# Patient Record
Sex: MALE | Race: WHITE | NOT HISPANIC OR LATINO | Employment: OTHER | URBAN - METROPOLITAN AREA
[De-identification: names, ages, dates, MRNs, and addresses within clinical notes are randomized per-mention and may not be internally consistent; named-entity substitution may affect disease eponyms.]

---

## 2019-11-18 PROBLEM — M54.12 RADICULOPATHY, CERVICAL REGION: Status: ACTIVE | Noted: 2019-11-18

## 2019-11-18 PROBLEM — M99.51 INTERVERTEBRAL DISC STENOSIS OF NEURAL CANAL OF CERVICAL REGION: Status: ACTIVE | Noted: 2019-11-18

## 2022-09-16 ENCOUNTER — NON-PROVIDER VISIT (OUTPATIENT)
Dept: NEUROLOGY | Facility: MEDICAL CENTER | Age: 77
End: 2022-09-16
Attending: SPECIALIST
Payer: MEDICARE

## 2022-09-16 DIAGNOSIS — G70.00 MYASTHENIA (HCC): ICD-10-CM

## 2022-09-16 DIAGNOSIS — R26.9 ABNORMAL GAIT: ICD-10-CM

## 2022-09-16 DIAGNOSIS — R29.898 BILATERAL LEG WEAKNESS: ICD-10-CM

## 2022-09-16 PROCEDURE — 95886 MUSC TEST DONE W/N TEST COMP: CPT | Mod: 26 | Performed by: SPECIALIST

## 2022-09-16 PROCEDURE — 95912 NRV CNDJ TEST 11-12 STUDIES: CPT | Performed by: SPECIALIST

## 2022-09-16 PROCEDURE — 95912 NRV CNDJ TEST 11-12 STUDIES: CPT | Mod: 26 | Performed by: SPECIALIST

## 2022-09-16 PROCEDURE — 95937 NEUROMUSCULAR JUNCTION TEST: CPT | Performed by: SPECIALIST

## 2022-09-16 PROCEDURE — 95937 NEUROMUSCULAR JUNCTION TEST: CPT | Mod: 26 | Performed by: SPECIALIST

## 2022-09-16 PROCEDURE — 95886 MUSC TEST DONE W/N TEST COMP: CPT | Performed by: SPECIALIST

## 2022-09-16 NOTE — PROCEDURES
NERVE CONDUCTION STUDIES AND ELECTROMYOGRAPHY REPORT        09/16/22      Referring provider: Greg Doherty M.D.      SUMMARY OF PATIENT'S CLINICAL HISTORY,PHYSICAL EXAM, AND RATIONALE FOR TESTING:    Mr. Preet Cardenas 77 y.o. presenting with difficulty with ambulation and fluctuating weakness and fatigue in a 77-year-old patient with type 2 diabetes.    Past Medical History is significant for : No past medical history on file.    The electrodiagnostic studies were performed to evaluate for possible peripheral neuropathy, myopathy, possible neuromuscular disease.      ELECTRODIAGNOSTIC EXAMINATION:  Nerve conduction studies (NCS) and electromyography (EMG) are utilized to evaluate direct or indirect damage to the peripheral nervous system. NCS are performed to measure the nerve(s) response(s) to electrostimulation across a given nerve segment. EMG evaluates the passive and active electrical activity of the muscle(s) in question.  Muscles are innervated by specific peripheral nerves and roots. Often times, several nerves the muscle to be examined in order to determine the presence or absence of the disease process. Furthermore, nerves and muscles may need to be tested in a [id number] comparison, as well as in additional extremities, as this may be crucial in characterizing the extent of the disease process, which may be diffuse or isolated and of varying degree of severity. The extent of the neurodiagnostic exam is justified as it may help arrive to a proper diagnosis, which ultimately may contribute to better management of the patient. Therefore, the nerves to muscles examined during the study were medically necessary.    Unless otherwise noted, temperature of the extremity(s) study was monitored before and during the examination and remained between 32 and 36 degrees C for the upper extremities, and between 30 and 36 degrees C for the lower extremities.      NERVE CONDUCTION STUDIES:  Sensory nerves:   -Right  median sensory nerve was examined.The response was abnormal.  Onset latency was within normal limits, amplitude was decreased to 3.4 µV.  -Right ulnar sensory nerve was examined. The response was within normal limits.  - Bilateral Sural nerves were tested. The responses were not elicitable with antidromic stimuli bilaterally.    Motor nerves:   -Right median motor nerve was examined. Recording electrodes placed at the Abductor Pollicis Brevis muscles. The response was within normal limits.  -Right ulnar motor nerve was examined. Recording electrodes placed at the Abductor Digiti Minimi muscles. The response was abnormal.  Onset latency was within normal limits, amplitude was decreased to 5.7 mV and conduction velocity was within normal limits.  - Bilateral Tibial nerves were examined. Recording electrodes placed at the Abductor Hallucis muscles. The responses were abnormal bilaterally, onset latency was within normal limits bilaterally, amplitude was decreased to 1.0 mV bilaterally and conduction velocity was normal bilaterally.  - Bilateral Deep Peroneal motor nerves were examined. Recording electrodes were placed at the Extensor Digitorum Brevis muscles.The responses were abnormal bilaterally.  The response on the left could not be elicited with either proximal or distal stimuli.  The response on the right exhibited normal onset latency and decreased amplitude of 2.0 mV.  -Left common peroneal motor nerve was examined.  Recording electrodes were placed at the tibialis anterior muscle.  The response was within normal limits.    Repetitive stimulation:  Repetitive stimulation was performed on the right median nerve with recording over the abductor pollicis brevis muscle.  The response was within normal limits without incremental or decremental response.      ELECTROMYOGRAPHY:  The study was performed the concentric needle electrode. Fibrillation and fasciculation activity is graded by convention from none (0) to  continuous (4+).  Needle electrode examination was performed in the following muscles: Right deltoid, biceps, triceps, first dorsal interosseous, abductor pollicis brevis, bilateral vastus lateralis, tibialis anterior, gastrocnemius, extensor digitorum brevis, abductor hallucis.  No acute denervation changes were noted, there was no increased insertional activity fibrillation potentials or positive sharp waves.  Chronic neuropathic changes with decreased amplitude responses and decreased recruitment were noted in the right abductor hallucis extensor digitorum brevis and left abductor hallucis.  No motor units could be produced in the left extensor digitorum brevis muscle.      Nerve Conduction Studies     Stim Site NR Onset (ms) Norm Onset (ms) O-P Amp (µV) Norm O-P Amp Site1 Site2 Delta-P (ms) Dist (cm) Link (m/s) Norm Link (m/s)   Right Median Anti Sensory (2nd Digit)   Wrist    3.3 <3.8 *3.4 >10 Wrist 2nd Digit 4.0 16.0 *40 >50   Left Sural Anti Sensory (Lat Mall)   Calf *NR  <4.6  >3 Calf Lat Mall  14.0  >40   Right Sural Anti Sensory (Lat Mall)   Calf *NR  <4.6  >3 Calf Lat Mall  14.0  >40   Right Ulnar Anti Sensory (5th Digit)   Wrist    3.2 <3.2 6.5 >5 Wrist 5th Digit 4.0 14.0 *35 >50        Stim Site NR Onset (ms) Norm Onset (ms) O-P Amp (mV) Norm O-P Amp Site1 Site2 Delta-0 (ms) Dist (cm) Link (m/s) Norm Link (m/s)   Right Median Motor (Abd Poll Brev)   Wrist    3.9 <4 7.1 >5 Elbow Wrist 5.8 31.5 54 >50   Elbow    9.7  6.8          Left Peroneal EDB Motor (Ext Dig Brev)   Ankle *NR  <6  >2.5 B Fib Ankle  0.0  >40   B Fib *NR     Poplt B Fib  0.0     Poplt *NR             Right Peroneal EDB Motor (Ext Dig Brev)   Ankle    4.4 <6 *2.0 >2.5 B Fib Ankle 10.0 39.5 40 >40   B Fib    14.4  1.3  Poplt B Fib 2.4 10.0 42    Poplt    16.8  1.2          Left Peroneal TA Motor (AntTibialis)   Fib Head    3.7 <4.5 3.9 3 Poplit Fib Head 2.0 10.0 50 >40   Poplit    5.7  3.7          Left Tibial Motor (Abd Daugherty Brev)   Ankle     5.7 <6 *1.0 >4 Knee Ankle 9.5 47.5 50 >40   Knee    15.2  0.7          Right Tibial Motor (Abd Daugherty Brev)   Ankle    2.9 <6 *1.0 >4 Knee Ankle 10.9 46.0 42 >40   Knee    13.8  0.7          Right Ulnar FDI Motor (FDI)   Wrist    3.0 <4.5 *5.7 >7 B Elbow Wrist 5.4 27.0 50 >50   B Elbow    8.4  4.7  A Elbow B Elbow 1.6 10.0 63    A Elbow    10.0  3.2                                       Electromyography     Side Muscle Nerve Root Ins Act Fibs Psw Amp Dur Poly Recrt Int Pat Comment   Left VastusLat Femoral L2-4 Nml Nml Nml Nml Nml 0 Nml Nml    Left AntTibialis Dp Br Fibular L4-5 Nml Nml Nml Nml Nml 0 Nml Nml    Left Gastroc Tibial S1-2 Nml Nml Nml Nml Nml 0 Nml Nml    Left Ext Dig Brev Dp Br Fibular L5, S1 Nml Nml Nml Nml Nml 0 *NoActivity Nml    Left AbdHallucis MedPlantar S1-2 Nml Nml Nml *Decr Nml 0 *Reduced *25%    Right VastusLat Femoral L2-4 Nml Nml Nml Nml Nml 0 Nml Nml    Right AntTibialis Dp Br Fibular L4-5 Nml Nml Nml Nml Nml 0 Nml Nml    Right Gastroc Tibial S1-2 Nml Nml Nml Nml Nml 0 Nml Nml    Right Ext Dig Brev Dp Br Fibular L5, S1 Nml Nml Nml *Incr Nml 0 *Reduced *75%    Right AbdHallucis MedPlantar S1-2 Nml Nml Nml *Decr Nml 0 *Reduced *25%    Right Deltoid Axillary C5-6 Nml Nml Nml Nml Nml 0 Nml Nml    Right Biceps Musculocut C5-6 Nml Nml Nml Nml Nml 0 Nml Nml    Right Triceps Radial C6-7-8 Nml Nml Nml Nml Nml 0 Nml Nml    Right 1stDorInt Ulnar C8-T1 Nml Nml Nml Nml Nml 0 Nml Nml    Right Abd Poll Brev Median C8-T1 Nml Nml Nml Nml Nml 0 Nml Nml            RNS     Trial # Label Amp 1 (mV)  O-P Amp 5 (mV)  O-P Amp % Dif Area 1 (mV·ms) Area 5 (mV·ms) Area % Dif Rep Rate Train Length Pause Time (min:sec) Comments   Right Abd Poll Brev   Tr 1 Baseline 7.77 7.72 -0.6 20.32 19.16 -5.7 3.00 10 01:00    Tr 2 Post Exercise 7.72 7.89 2.3 24.89 24.93 0.2 3.00 10 01:00    Tr 3 1 min Post 8.10 8.00 -1.2 23.31 21.59 -7.4 3.00 10 01:00    Tr 4 2 min Post 8.29 8.80 6.1 22.95 20.99 -8.6 3.00 10 01:00    Tr 5 3 min Post  8.79 8.81 0.1 21.77 20.17 -7.4 3.00 10 00:00      DIAGNOSTIC INTERPRETATION:   Extensive electrodiagnostic studies were performed to the right upper extremity and bilateral lower extremities.  The results are as follows:    1.  Low amplitude right ulnar motor response and right median sensory response with normal right median motor and right ulnar sensory responses and no acute or chronic denervation changes in selected muscles studied right upper extremity.    2.  Low amplitude or absent bilateral peroneal and tibial motor responses and absent sural sensory responses.  No acute denervation changes were noted but chronic neuropathic changes with decreased recruitment were noted in bilateral extensor digitorum brevis and abductor hallucis muscles.    3.  Normal repetitive stimulation of the right median nerve without evidence for myasthenia gravis.  Clinical correlation is suggested, the presence of normal repetitive stimulation does not completely exclude a diagnosis of myasthenia gravis.    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  Clinical correlation is suggested.      IGOR Galicia M.D. (No note.)

## 2023-08-11 ENCOUNTER — TELEPHONE (OUTPATIENT)
Dept: NEUROLOGY | Facility: MEDICAL CENTER | Age: 78
End: 2023-08-11
Payer: MEDICARE

## 2023-08-11 ENCOUNTER — OFFICE VISIT (OUTPATIENT)
Dept: NEUROLOGY | Facility: MEDICAL CENTER | Age: 78
End: 2023-08-11
Attending: PSYCHIATRY & NEUROLOGY
Payer: MEDICARE

## 2023-08-11 VITALS
TEMPERATURE: 97.2 F | BODY MASS INDEX: 24.79 KG/M2 | OXYGEN SATURATION: 95 % | HEART RATE: 59 BPM | WEIGHT: 210 LBS | DIASTOLIC BLOOD PRESSURE: 68 MMHG | HEIGHT: 77 IN | SYSTOLIC BLOOD PRESSURE: 120 MMHG | RESPIRATION RATE: 16 BRPM

## 2023-08-11 DIAGNOSIS — G60.8 PERIPHERAL SENSORY-MOTOR AXONAL POLYNEUROPATHY: ICD-10-CM

## 2023-08-11 DIAGNOSIS — G72.3 PERIODIC PARALYSIS: Primary | ICD-10-CM

## 2023-08-11 DIAGNOSIS — I95.9 HYPOTENSION, UNSPECIFIED HYPOTENSION TYPE: ICD-10-CM

## 2023-08-11 PROCEDURE — 99212 OFFICE O/P EST SF 10 MIN: CPT | Performed by: PSYCHIATRY & NEUROLOGY

## 2023-08-11 PROCEDURE — G2212 PROLONG OUTPT/OFFICE VIS: HCPCS | Performed by: PSYCHIATRY & NEUROLOGY

## 2023-08-11 PROCEDURE — 3074F SYST BP LT 130 MM HG: CPT | Performed by: PSYCHIATRY & NEUROLOGY

## 2023-08-11 PROCEDURE — 99205 OFFICE O/P NEW HI 60 MIN: CPT | Performed by: PSYCHIATRY & NEUROLOGY

## 2023-08-11 PROCEDURE — 3078F DIAST BP <80 MM HG: CPT | Performed by: PSYCHIATRY & NEUROLOGY

## 2023-08-11 RX ORDER — NIFEDIPINE 30 MG
30 TABLET, EXTENDED RELEASE ORAL DAILY
COMMUNITY
End: 2023-08-11

## 2023-08-11 RX ORDER — CHOLECALCIFEROL (VITAMIN D3) 1250 MCG
CAPSULE ORAL
COMMUNITY

## 2023-08-11 RX ORDER — MELATONIN
1000
COMMUNITY

## 2023-08-11 RX ORDER — DOXAZOSIN 2 MG/1
2 TABLET ORAL 2 TIMES DAILY
COMMUNITY
Start: 2023-08-08

## 2023-08-11 RX ORDER — POTASSIUM CHLORIDE 20 MEQ/1
20 TABLET, EXTENDED RELEASE ORAL
COMMUNITY

## 2023-08-11 RX ORDER — ELECTROLYTES/DEXTROSE
SOLUTION, ORAL ORAL DAILY
COMMUNITY

## 2023-08-11 RX ORDER — FEXOFENADINE HCL 180 MG/1
180 TABLET ORAL DAILY
COMMUNITY

## 2023-08-11 RX ORDER — UBIDECARENONE 100 MG
CAPSULE ORAL DAILY
COMMUNITY

## 2023-08-11 RX ORDER — DIPHENHYDRAMINE HCL 25 MG
TABLET ORAL PRN
COMMUNITY

## 2023-08-11 RX ORDER — TAMSULOSIN HYDROCHLORIDE 0.4 MG/1
0.4 CAPSULE ORAL DAILY
COMMUNITY
End: 2023-08-11

## 2023-08-11 RX ORDER — ATENOLOL 25 MG/1
1 TABLET ORAL 2 TIMES DAILY
COMMUNITY
Start: 2022-03-03

## 2023-08-11 RX ORDER — THIAMINE HCL 100 MG
1 TABLET ORAL
COMMUNITY

## 2023-08-11 ASSESSMENT — ENCOUNTER SYMPTOMS
SENSORY CHANGE: 0
CONSTIPATION: 0
TINGLING: 0
NAUSEA: 0
MEMORY LOSS: 0
WEIGHT LOSS: 1
WEAKNESS: 1

## 2023-08-11 ASSESSMENT — PATIENT HEALTH QUESTIONNAIRE - PHQ9: CLINICAL INTERPRETATION OF PHQ2 SCORE: 0

## 2023-08-11 ASSESSMENT — FIBROSIS 4 INDEX: FIB4 SCORE: 1.383404992768562504

## 2023-08-11 NOTE — TELEPHONE ENCOUNTER
NEUROLOGY PATIENT PRE-VISIT PLANNING     Patient was NOT contacted to complete PVP.    Patient Appointment is scheduled as: New Patient     Is visit type and length scheduled correctly? Yes    [employer] Patient is checked in Patient Demographics? Yes    3.   Is referral attached to visit? Yes    4. Were records received from referring provider? Yes, referring provider is an Epic user so records are in [facility].     4. Patient was NOT contacted to have someone accompany them to visit.     5. Is this appointment scheduled as a Hospital Follow-Up?  No    6. Does the patient require any pre procedure or post procedure follow up? No    7. If any orders were placed at last visit or intended to be done for this visit do we have Results/Consult Notes? Yes  Labs -  Recent labs are in Epic.   Imaging - Imaging was not ordered at last office visit.  Referrals - No referrals were ordered at last office visit.    8. If patient appointment is for Botox - is order pended for provider? N/A

## 2023-08-12 NOTE — PROGRESS NOTES
Subjective     Preet Cardenas is a 78 y.o. male who presents with his wife Sakina, from the office of Dr. Greg Doherty MD, with a history of fluctuating generalized weakness consistent with possible periodic paralysis.  Extensive records were available for review, including those from the patient himself as well as in the electronic health record.  He presents with extensive daily diaries documenting symptoms, as well as electrolyte levels, etc.    HPI    Mr. Cardenas is a very pleasant 78-year-old right-handed gentleman who noted fatigue and loss of endurance even as a teenager.  His brother would remind him of hikes that they would take together, recalling that the patient would have to stop and turn around because of weakness and a simple inability to continue.    The weakness he suffers from has always had an episodic quality to it.  He remembers the episodes of starting with a generalized sense involving the proximal upper and lower extremities spreading distally.  This resulted in significant difficulty with walking as well as elevating the arms over his head.  The episodes were initially lasting for a matter of hours, always worse in the mornings, seemingly improved towards the end of the day.  There were never any associated sensory distortions.  In the last 5 years or so, the events started happening on a daily basis, it is now simply an issue of how long the weakness will last on a given day.  The symptoms are always the same in nature, the severity has worsened to the point where at times he cannot even walk, and nowadays there is never really a full recovery between the more severe symptom exacerbations.  He is now also complaining of shortness of breath that occurs with the weakness.  Fatigue has become a constant friend.    The muscles can feel aching, but they are never tender to deep palpation.  The weakness can be enough that he can stand up from a seated position, legs can collapse underneath him  if he is not cautious.  It seems to involve the proximal musculatures more profoundly.  He has never had issues with neck pain, cognitive deficits, bulbar symptoms, visual loss or diplopia, changes in bowel or bladder function, etc.    He has never found a consistent relationship of symptom onset or exacerbation following eating high carbohydrate meals, following rest after exercise, cold exposure, or following potassium ingestion (see below).  He has never had issues of myotonia and dystonic posturing.  He did have the ability to check potassium levels during his episodes as they worsen, levels were always in either of the low normal range or normal, never below 2.5.    He does have a history of orthostatic hypotension, though syncope has never been a regular component.  He denies issues with early satiety involving nausea and vomiting, excessive constipation or urinary retention.    For brief interval he tried both supplementation with potassium and magnesium, there did seem to be some initial benefit, though this has been lost.  Statins in the past caused significant myalgias, these were discontinued without sustained deficit or change in his overall symptoms.  Under the guidance of a local neurologist, a Mestinon trial of 60 mg, 3 times daily, was attempted and proved ineffective.    Work-up itself has been thorough, there was concern initially of a CNS autoimmune disease like MS, though imaging of the entire spinal axis proved negative.  (The images of the brain are not available for my review, I did review images of the spine done in December, 2021, and these were unremarkable for any type of intramedullary cord signal abnormality, enhancement, stenosis, etc., at any level.)  Spinal fluid was also obtained and was negative for elevations in IgG synthetic rate or the presence of OCB's.  EMG/NCV studies were unremarkable, from September, 2022, revealed evidence of an axonal sensorimotor polyneuropathy only, this  "was considered incidental, no evidence of myopathic or neuromuscular junctional disease (repetitive stimulation was normal).    Blood work has been extensive including vitamin levels, hemoglobin A1c, HIV and hepatitis screens, autoimmune screens including Sjogren antibody, GRAYSON, dsDNA, anti-Smith, anti-MOG, thyroid function, myasthenic panel, CPK, Anti-MuSK, heavy metal screen, and potassium and calcium channel apathies, all proving unremarkable.  Genetic screens have yet to be done.    Medical history is remarkable for asymptomatic, orthostatic hypotension without gastroparesis, no history of diabetes, CVA, CAD, PVD, malignancy, autoimmune disease, MS, seizure, neurodegenerative disease, liver or kidney disease, blood dyscrasia, or pulmonary disease.  There is no surgical history of note from my standpoint.    He is not aware of anyone in the family who has ever suffered from similar symptoms of a childhood onset, progressive nature resulting in permanent weakness.    He does not smoke, alcohol is consumed and minimal quantity.    He is on vitamin D with calcium, magnesium, Cardura, Tenormin, vitamin B complex, coenzyme Q10, baby aspirin, and several mineral supplements.    Review of Systems   Constitutional:  Positive for malaise/fatigue and weight loss.   Gastrointestinal:  Negative for constipation and nausea.   Genitourinary:  Negative for dysuria.   Neurological:  Positive for weakness. Negative for tingling and sensory change.   Psychiatric/Behavioral:  Negative for memory loss.    All other systems reviewed and are negative.    Objective     /68 (Patient Position: Supine)   Pulse (!) 59   Temp 36.2 °C (97.2 °F) (Temporal)   Resp 16   Ht 1.956 m (6' 5\")   Wt 95.3 kg (210 lb)   SpO2 95%   BMI 24.90 kg/m²      Physical Exam    He appears in no acute distress.  Vital signs are stable.  Orthostatic blood pressure and pulse were checked while supine at 120/68 and 59, seated at 118/60 and 54, standing " at 110/58 and 58.  There is no malar rash, jaw or temporal tenderness, jaw claudication, or allodynia.  The neck is supple, range of motion is full, compression maneuvers are negative, Lhermitte's phenomena is absent.  Cardiac evaluation reveals a regular rhythm.  There is no lower extremity edema, straight leg raising is negative bilaterally.  Distal pulses are diminished but present bilaterally.    Neurological Exam    Fully oriented, there is no aphasia, agnosia, apraxia, or inattention.    PERRLA/EOMI, visual fields are full to finger counting on confrontation bilaterally.  Facial movements are symmetric, there is no ptosis, sensory exam is intact to temperature and light touch bilaterally.  The tongue and uvula are midline, jaw jerk is absent, jaw movements are intact.  Shoulder shrug and head rotation are normal.    Musculoskeletal exam reveals normal tone bilaterally, there is no tremor, asterixis, or drift.  There is no percussion myotonia over the thenar eminence.  There is no difficulty with relaxing his hands after 20 seconds of grasp.  Strength in the upper extremities is intact, there is a suggestion of mild hip flexor weakness on the right, quadriceps on the left, at worst 5 -/5.  Strength is otherwise intact throughout.  Reflexes are absent at the ankles, trace present at the knees, symmetric but more easily elicited in the upper extremities.  The toes are downgoing.    He walks with a steppage and magnetic quality, difficulty elevating the legs at the hip.  Stride length is shortened, he does not shuffle.  There is no appendicular dystaxia.  Repetitive movements in the feet and hands are actually normal with maintained and symmetric amplitude and frequencies.    Sensory exam reveals a stocking pattern decrement of vibration below the ankles, temperature is diminished slightly below the mid shins.  Romberg is absent.    Assessment & Plan     1. Periodic paralysis  Still of unclear etiology, most  symptomatic conditions have been ruled out, the possibility of either hypo or hyperkalemic periodic paralysis still needs to be entertained though his clinical picture is a little atypical.  This is especially true absence of family history of the same since both of these disorders are typically autosomal dominant and inheritance though the hypokalemic form can be sporadic.  The fact that his potassium levels, while symptomatic, were normal makes the latter process less likely; this is consistent with hyperkalemic episodes.    Given the unusual and rare nature of this condition, I would recommend referral to an outlying center, I will send him to [facility].  Referral was made.  Once the genetics have been ordered, we will notify him as to results when available.  He was told this may take several weeks.  Absence of clear cause, directed treatments, obviously, cannot be offered.  Fortunately I doubt this is a condition that will suddenly progress and leave him paralyzed, and though it is frustrating, we do have some time to gain some additional information.  We can follow-up into the future following his evaluations at [facility].    - Referral to Neurology    2. Hypotension, unspecified hypotension type  Doubtful of clinical significance, something incidental, he does relate a history of this process.  It does not seem to have progressed.    3.  Axonal sensorimotor polyneuropathy  An incidental finding, independent of the condition that seems to be causing his primary complaints, he lacks a history of typical risk, but most of the other primary neurologic and medical conditions that might be at cause have already been assessed as part of the work-up for his weakness.  He is minimally symptomatic, this does not need to be treated, simply observed.    Time: 90 minutes in total spent on patient care including precharting, record review, discussion with healthcare staff and documentation.  This includes face-to-face time  for exam, review, discussion, as well as counseling and coordinating care.